# Patient Record
Sex: MALE | Race: AMERICAN INDIAN OR ALASKA NATIVE | Employment: OTHER | URBAN - METROPOLITAN AREA
[De-identification: names, ages, dates, MRNs, and addresses within clinical notes are randomized per-mention and may not be internally consistent; named-entity substitution may affect disease eponyms.]

---

## 2018-11-19 ENCOUNTER — HOSPITAL ENCOUNTER (EMERGENCY)
Facility: CLINIC | Age: 73
Discharge: HOME | End: 2018-11-19
Attending: NURSE PRACTITIONER | Admitting: NURSE PRACTITIONER
Payer: MEDICARE

## 2018-11-19 VITALS
OXYGEN SATURATION: 94 % | DIASTOLIC BLOOD PRESSURE: 90 MMHG | HEART RATE: 65 BPM | SYSTOLIC BLOOD PRESSURE: 178 MMHG | TEMPERATURE: 98 F | RESPIRATION RATE: 16 BRPM

## 2018-11-19 DIAGNOSIS — S61.209A: ICD-10-CM

## 2018-11-19 PROCEDURE — 99282 EMERGENCY DEPT VISIT SF MDM: CPT

## 2018-11-19 ASSESSMENT — ENCOUNTER SYMPTOMS
NUMBNESS: 0
WOUND: 1
COLOR CHANGE: 0

## 2018-11-19 NOTE — ED AVS SNAPSHOT
[facility]    [address] [place] [address]    Phone:  [phone]    Fax:  [phone]                                       Fer Leonard   MRN: [id number]    Department:  [facility]   Date of Visit:  11/19/2018           Patient Information     Date Of Birth          1945        Your diagnoses for this visit were:     Ring avulsion injury of finger of left hand        You were seen by Paty Montano APRN CNP.      Follow-up Information     Follow up with Terrell Tam MD In 2 days.    Specialty:  Internal Medicine    Why:  For wound re-check    Contact information:    [facility]  [address]   [place] [address]  [phone]          Follow up with [facility].    Specialty:  EMERGENCY MEDICINE    Why:  As needed, If symptoms worsen    Contact information:    [address] [place] [id number] [phone]        Discharge Instructions       Discharge Instructions  Laceration (Cut)    You were seen today for a laceration (cut).  Your provider examined your laceration for any problems such a buried foreign body (like glass, a splinter, or gravel), or injury to blood vessels, tendons, and nerves.  Your provider may have also rinsed and/or scrubbed your laceration to help prevent an infection. It may not be possible to find all problems with your laceration on the first visit; occasionally foreign bodies or a tendon injury can go undetected.    Your laceration may have been closed in one of several ways:    No closure: many wounds will heal just fine without closure.    Stitches: regular stitches that require removal.    Staples: skin staples are often used in the scalp/head.    Wound adhesive (glue): skin glue can be used for certain lacerations and doesn t require removal.    Wound strips (aka Butterfly bandages or steri-strips): these are  bandages that help to close a wound.    Absorbable stitches:  dissolving  stitches that go away on their own and usually don t require removal.    A small percentage of wounds will develop an infection regardless of how well the wound is cared for. Antibiotics are generally not indicated to prevent an infection so are only given for a small number of high-risk wounds. Some lacerations are too high risk to close, and are left open to heal because closure can increase the likelihood that an infection will develop.    Remember that all lacerations, no matter how expertly repaired, will cause scarring. We consider many factors, techniques, and materials, in our efforts to provide the best possible cosmetic outcome.    Generally, every Emergency Department visit should have a follow-up clinic visit with either a primary or a specialty clinic/provider. Please follow-up as instructed by your emergency provider today.     Return to the Emergency Department right away if:    You have more redness, swelling, pain, drainage (pus), a bad smell, or red streaking from your laceration as these symptoms could indicate an infection.    You have a fever of 100.4 F or more.    You have bleeding that you cannot stop at home. If your cut starts to bleed, hold pressure on the bleeding area with a clean cloth or put pressure over the bandage.  If the bleeding does not stop after using constant pressure for 30 minutes, you should return to the Emergency Department for further treatment.    An area past the laceration is cool, pale, or blue compared with the other side, or has a slower return of color when squeezed.    Your dressing seems too tight or starts to get uncomfortable or painful. For children, signs of a problem might be irritability or restlessness.    You have loss of normal function or use of an area, such as being unable to straighten or bend a finger normally.    You have a numb area past the laceration.    Return to the  Emergency Department or see your regular provider if:    The laceration starts to come open.     You have something coming out of the cut or a feeling that there is something in the laceration.    Your wound will not heal, or keeps breaking open. There can always be glass, wood, dirt or other things in any wound.  They will not always show up, even on x-rays.  If a wound does not heal, this may be why, and it is important to follow-up with your regular provider.    Home Care:    Take your dressing off in 12-24 hours, or as instructed by your provider, to check your laceration. Remove the dressing sooner if it seems too tight or painful, or if it is getting numb, tingly, or pale past the dressing.    Gently wash your laceration 1-2 times daily with clean water and mild soap. It is okay to shower or run clean water over the laceration, but do not let the laceration soak in water (no swimming).    If your laceration was closed with wound adhesive or strips: pat it dry and leave it open to the air. For all other repairs: after you wash your laceration, or at least 2 times a day, apply antibiotic ointment (such as Neosporin  or Bacitracin ) to the laceration, then cover it with a Band-Aid  or gauze.    Keep the laceration clean. Wear gloves or other protective clothing if you are around dirt.    Follow-up for removal:    If your wound was closed with staples or regular stitches, they need to be removed according to the instructions and timeline specified by your provider today.    If your wound was closed with absorbable ( dissolving ) sutures, they should fall out, dissolve, or not be visible in about one week. If they are still visible, then they should be removed according to the instructions and timeline specified by your provider today.    Scars:  To help minimize scarring:    Wear sunscreen over the healed laceration when out in the sun.    Massage the area regularly once healed.    You may apply Vitamin E to the  healed wound.    Wait. Scars improve in appearance over months and years.    If you were given a prescription for medicine here today, be sure to read all of the information (including the package insert) that comes with your prescription.  This will include important information about the medicine, its side effects, and any warnings that you need to know about.  The pharmacist who fills the prescription can provide more information and answer questions you may have about the medicine.  If you have questions or concerns that the pharmacist cannot address, please call or return to the Emergency Department.       Remember that you can always come back to the Emergency Department if you are not able to see your regular provider in the amount of time listed above, if you get any new symptoms, or if there is anything that worries you.      24 Hour Appointment Hotline       To make an appointment at any [facility], call [phone] ([phone]). If you don't have a family doctor or clinic, we will help you find one. [facility] are conveniently located to serve the needs of you and your family.             Review of your medicines      Our records show that you are taking the medicines listed below. If these are incorrect, please call your family doctor or clinic.        Dose / Directions Last dose taken    atorvastatin 40 MG tablet   Commonly known as:  LIPITOR   Dose:  40 mg   Quantity:  90 tablet        Take 1 tablet (40 mg) by mouth every evening   Refills:  3        azelastine 0.1 % nasal spray   Commonly known as:  ASTELIN   Dose:  2 spray        Spray 2 sprays in nostril daily. 2 sprays each nostril once daily   Refills:  0        CIALIS 10 MG tablet   Dose:  10 mg   Generic drug:  tadalafil        Take 10 mg by mouth daily   Refills:  0        clopidogrel 75 MG tablet   Commonly known as:  PLAVIX   Dose:  75 mg   Quantity:  30 tablet        Take 1 tablet (75 mg) by mouth daily   Refills:  1         desonide 0.05 % cream   Commonly known as:  DESOWEN   Dose:  0.5 inch        Apply 0.5 inches topically 2 times daily as needed.   Refills:  0        fluocinonide 0.05 % ointment   Commonly known as:  LIDEX        Apply topically daily as needed   Refills:  0        fluticasone 50 MCG/ACT spray   Commonly known as:  FLONASE   Dose:  1 spray        1 spray by Both Nostrils route 2 times daily.   Refills:  0        hydrocortisone 2.5 % ointment        Apply topically 2 times daily as needed   Refills:  0        losartan 25 MG tablet   Commonly known as:  COZAAR   Dose:  25 mg   Quantity:  90 tablet        Take 1 tablet (25 mg) by mouth daily   Refills:  4        melatonin 5 MG tablet   Dose:  5 mg        Take 5 mg by mouth nightly as needed for sleep   Refills:  0        metoprolol tartrate 25 MG tablet   Commonly known as:  LOPRESSOR   Dose:  12.5 mg   Quantity:  90 tablet        Take 0.5 tablets (12.5 mg) by mouth 2 times daily   Refills:  4        nitroGLYcerin 0.4 MG sublingual tablet   Commonly known as:  NITROSTAT   Dose:  0.4 mg        Place 0.4 mg under the tongue every 5 minutes as needed for chest pain   Refills:  0        traZODone 50 MG tablet   Commonly known as:  DESYREL   Dose:  50 mg        Take 50 mg by mouth At Bedtime   Refills:  0        valACYclovir 1000 mg tablet   Commonly known as:  VALTREX   Dose:  1000 mg        Take 1,000 mg by mouth daily   Refills:  0        vitamin B complex with vitamin C Tabs tablet   Commonly known as:  STRESS TAB   Dose:  1 tablet        Take 1 tablet by mouth daily.   Refills:  0        VITAMIN D-3 PO   Dose:  1000 Units        Take 1,000 Units by mouth daily.   Refills:  0                Orders Needing Specimen Collection     None      Pending Results     No orders found from 11/17/2018 to 11/20/2018.            Pending Culture Results     No orders found from 11/17/2018 to 11/20/2018.            Pending Results Instructions     If you had any lab results that were  not finalized at the time of your Discharge, you can call the ED Lab Result RN at [phone]. You will be contacted by this team for any positive Lab results or changes in treatment. The nurses are available 7 days a week from 10A to 6:30P.  You can leave a message 24 hours per day and they will return your call.        Test Results From Your Hospital Stay               Clinical Quality Measure: Blood Pressure Screening     Your blood pressure was checked while you were in the emergency department today. The last reading we obtained was  BP: 178/90 . Please read the guidelines below about what these numbers mean and what you should do about them.  If your systolic blood pressure (the top number) is less than 120 and your diastolic blood pressure (the bottom number) is less than 80, then your blood pressure is normal. There is nothing more that you need to do about it.  If your systolic blood pressure (the top number) is 120-139 or your diastolic blood pressure (the bottom number) is 80-89, your blood pressure may be higher than it should be. You should have your blood pressure rechecked within a year by a primary care provider.  If your systolic blood pressure (the top number) is 140 or greater or your diastolic blood pressure (the bottom number) is 90 or greater, you may have high blood pressure. High blood pressure is treatable, but if left untreated over time it can put you at risk for heart attack, stroke, or kidney failure. You should have your blood pressure rechecked by a primary care provider within the next 4 weeks.  If your provider in the emergency department today gave you specific instructions to follow-up with your doctor or provider even sooner than that, you should follow that instruction and not wait for up to 4 weeks for your follow-up visit.        Thank you for choosing [facility]       Thank you for choosing [facility] for your care. Our goal is always to provide you with excellent care. Hearing  back from our patients is one way we can continue to improve our services. Please take a few minutes to complete the written survey that you may receive in the mail after you visit with us. Thank you!        [facility] Information     [facility] gives you secure access to your electronic health record. If you see a primary care provider, you can also send messages to your care team and make appointments. If you have questions, please call your primary care clinic.  If you do not have a primary care provider, please call [phone] and they will assist you.        Care EveryWhere ID     This is your Care EveryWhere ID. This could be used by other organizations to access your [facility] medical records  [id number]        Equal Access to Services     JUD OLEA : Mike Emery, david lopez, sunita moreira, tali ceja. So [facility] [phone].    ATENCIÓN: Si habla español, tiene a ghosh disposición servicios gratuitos de asistencia lingüística. Llame al [phone].    We comply with applicable federal civil rights laws and [place] laws. We do not discriminate on the basis of race, color, national origin, age, disability, sex, sexual orientation, or gender identity.            After Visit Summary       This is your record. Keep this with you and show to your community pharmacist(s) and doctor(s) at your next visit.

## 2018-11-19 NOTE — ED PROVIDER NOTES
History     Chief Complaint:  Laceration      HPI   Fer Leonard is a 73 year old male who presents with injury to left ring finger.  Immediately prior to arrival patient was using a tool similar to a saw to slice there is some plastic and it slipped, causing injury through nail plate.  He is on Plavix and presents to ED as he has been having difficulty controlling the bleeding with direct pressure.  He denies any numbness in his finger.  No further concerns.    Allergies:   Adhesive ape  Percodan    Medications:    Lipitor  Plavix  Losartan  Metoprolol  Cialis  Trazodone  Valtrex    Past Medical History:    MI  Asthma  CAD  HTN  Hypercholesteremia      Past Surgical History:    Appendectomy  Cardiac stents  ENT surgery  Orthopedic    Family History:    History reviewed. No pertinent family history.    Social History:  Marital Status:   [2]  Former Smoker  Negative for alcohol use.      Review of Systems   Skin: Positive for wound. Negative for color change.   Neurological: Negative for numbness.   All other systems reviewed and are negative.      Physical Exam     Patient Vitals for the past 24 hrs:   BP Temp Temp src Pulse Heart Rate Resp SpO2   11/19/18 1340 - - - - - - 94 %   11/19/18 1338 178/90 98  F (36.7  C) Temporal 65 65 16 -         Physical Exam  Constitutional: Alert, attentive, GCS 15.    HEENT: Conjunctiva normal. Mucous membranes moist  CV: regular rate and rhythm; no murmurs, rubs or gallups. Cap refill <2seconds.  Respiratory: Effort normal. Lungs clear to auscultation bilaterally. No crackles/rubs/wheezes.  Good air movement.  MSK: Normal range of motion. No peripheral edema or calf tenderness.  Neurological: Alert, attentive.  Skin: Left ring finger: Avulsion type injury through nail bed with removal of distal half of nail plate.  Nail matrix remains intact. Minimal bleeding.  Distal sensation and circulation intact.  Psychiatric: Normal affect.    Emergency Department Course    Emergency Department Course:  Nursing notes and vitals reviewed. (1288) I performed an exam of the patient as documented above.    Findings and plan explained to the Patient. Patient discharged home with instructions regarding supportive care, medications, and reasons to return. The importance of close follow-up was reviewed.      Impression & Plan    Medical Decision Making:  Fer Leonard is a 73 year old male who presents for evaluation of trauma to the left ring finger. The wound was carefully evaluated and explored.  This was an avulsion type injury and not amendable to primary closure.  The area was thoroughly irrigated and cleansed. Gel foam applied, area wrapped and bleeding controlled.  There is no evidence of muscular, tendon, or bony damage with this laceration.  No signs of foreign body.  Possible complications (infection, scarring) were reviewed with the patient.  Follow up with primary care as noted in the discharge section.  Tetanus is up to date.    Critical Care time:  none    Diagnosis:    ICD-10-CM    1. Ring avulsion injury of finger of left hand S61.209A        Disposition:  discharged to home      Paty Montano  11/19/2018   [facility] EMERGENCY DEPARTMENT       Paty Montano, MOE CNP  11/19/18 1801       Paty Montano APRN CNP  11/19/18 1802

## 2018-11-19 NOTE — ED TRIAGE NOTES
Pt with laceration to left ring finger from wood shanna, states on blood thinners and up to date on tetanus. ABC's intact, alert and oriented X3.

## 2018-11-19 NOTE — ED AVS SNAPSHOT
[facility] Emergency Department    [address]    [facility] [address]    Phone:  [phone]    Fax:  [phone]                                       Fer Leonard   MRN: [id number]    Department:  [facility] Emergency Department   Date of Visit:  11/19/2018           After Visit Summary Signature Page     I have received my discharge instructions, and my questions have been answered. I have discussed any challenges I see with this plan with the nurse or doctor.    ..........................................................................................................................................  Patient/Patient Representative Signature      ..........................................................................................................................................  Patient Representative Print Name and Relationship to Patient    ..................................................               ................................................  Date                                   Time    ..........................................................................................................................................  Reviewed by Signature/Title    ...................................................              ..............................................  Date                                               Time          22EPIC Rev 08/18

## 2020-03-02 ENCOUNTER — HEALTH MAINTENANCE LETTER (OUTPATIENT)
Age: 75
End: 2020-03-02

## 2020-12-14 ENCOUNTER — HEALTH MAINTENANCE LETTER (OUTPATIENT)
Age: 75
End: 2020-12-14

## 2021-04-18 ENCOUNTER — HEALTH MAINTENANCE LETTER (OUTPATIENT)
Age: 76
End: 2021-04-18

## 2021-10-02 ENCOUNTER — HEALTH MAINTENANCE LETTER (OUTPATIENT)
Age: 76
End: 2021-10-02

## 2022-04-11 ENCOUNTER — HOSPITAL ENCOUNTER (EMERGENCY)
Facility: CLINIC | Age: 77
Discharge: LEFT BEFORE DISCHARGE | End: 2022-04-11
Admitting: EMERGENCY MEDICINE
Payer: MEDICARE

## 2022-04-11 VITALS
TEMPERATURE: 97 F | BODY MASS INDEX: 29.29 KG/M2 | HEART RATE: 61 BPM | OXYGEN SATURATION: 97 % | SYSTOLIC BLOOD PRESSURE: 164 MMHG | DIASTOLIC BLOOD PRESSURE: 84 MMHG | RESPIRATION RATE: 20 BRPM | WEIGHT: 210 LBS

## 2022-04-11 PROCEDURE — 999N000104 HC STATISTIC NO CHARGE

## 2022-04-11 PROCEDURE — 93005 ELECTROCARDIOGRAM TRACING: CPT

## 2022-04-11 NOTE — ED TRIAGE NOTES
"Pt arrives to the ED due to mid sternal chest pain that began 15 min ago while moving some boxes. Pt states the pain went away but that it then came back. Pt states that he does feel some SOB. Pt has h/o of MI 2012. Describes pain as \"ingestion\". Pain goes to bilateral arms.  "

## 2022-04-12 LAB
ATRIAL RATE - MUSE: 60 BPM
DIASTOLIC BLOOD PRESSURE - MUSE: NORMAL MMHG
INTERPRETATION ECG - MUSE: NORMAL
P AXIS - MUSE: -7 DEGREES
PR INTERVAL - MUSE: 180 MS
QRS DURATION - MUSE: 98 MS
QT - MUSE: 466 MS
QTC - MUSE: 466 MS
R AXIS - MUSE: 22 DEGREES
SYSTOLIC BLOOD PRESSURE - MUSE: NORMAL MMHG
T AXIS - MUSE: 37 DEGREES
VENTRICULAR RATE- MUSE: 60 BPM

## 2022-05-14 ENCOUNTER — HEALTH MAINTENANCE LETTER (OUTPATIENT)
Age: 77
End: 2022-05-14

## 2022-09-03 ENCOUNTER — HEALTH MAINTENANCE LETTER (OUTPATIENT)
Age: 77
End: 2022-09-03

## 2023-06-03 ENCOUNTER — HEALTH MAINTENANCE LETTER (OUTPATIENT)
Age: 78
End: 2023-06-03

## (undated) RX ORDER — LIDOCAINE HYDROCHLORIDE 10 MG/ML
INJECTION, SOLUTION EPIDURAL; INFILTRATION; INTRACAUDAL; PERINEURAL
Status: DISPENSED
Start: 2018-11-19